# Patient Record
Sex: MALE | Race: WHITE | Employment: FULL TIME | URBAN - NONMETROPOLITAN AREA
[De-identification: names, ages, dates, MRNs, and addresses within clinical notes are randomized per-mention and may not be internally consistent; named-entity substitution may affect disease eponyms.]

---

## 2020-09-15 ENCOUNTER — APPOINTMENT (OUTPATIENT)
Dept: CT IMAGING | Age: 50
End: 2020-09-15
Payer: COMMERCIAL

## 2020-09-15 ENCOUNTER — HOSPITAL ENCOUNTER (OUTPATIENT)
Age: 50
Setting detail: OBSERVATION
Discharge: HOME | End: 2020-09-16
Attending: EMERGENCY MEDICINE | Admitting: INTERNAL MEDICINE
Payer: COMMERCIAL

## 2020-09-15 ENCOUNTER — APPOINTMENT (OUTPATIENT)
Dept: GENERAL RADIOLOGY | Age: 50
End: 2020-09-15
Payer: COMMERCIAL

## 2020-09-15 PROBLEM — R55 SYNCOPE AND COLLAPSE: Status: ACTIVE | Noted: 2020-09-15

## 2020-09-15 LAB
ANION GAP SERPL CALCULATED.3IONS-SCNC: 12 MEQ/L (ref 8–16)
BASOPHILS # BLD: 1.5 %
BASOPHILS ABSOLUTE: 0.1 THOU/MM3 (ref 0–0.1)
BUN BLDV-MCNC: 16 MG/DL (ref 7–22)
CALCIUM SERPL-MCNC: 9.4 MG/DL (ref 8.5–10.5)
CHLORIDE BLD-SCNC: 104 MEQ/L (ref 98–111)
CO2: 24 MEQ/L (ref 23–33)
CREAT SERPL-MCNC: 0.9 MG/DL (ref 0.4–1.2)
D-DIMER QUANTITATIVE: 581 NG/ML FEU (ref 0–500)
EKG ATRIAL RATE: 70 BPM
EKG P AXIS: 68 DEGREES
EKG P-R INTERVAL: 130 MS
EKG Q-T INTERVAL: 378 MS
EKG QRS DURATION: 94 MS
EKG QTC CALCULATION (BAZETT): 408 MS
EKG R AXIS: 76 DEGREES
EKG T AXIS: 71 DEGREES
EKG VENTRICULAR RATE: 70 BPM
EOSINOPHIL # BLD: 6.6 %
EOSINOPHILS ABSOLUTE: 0.4 THOU/MM3 (ref 0–0.4)
ERYTHROCYTE [DISTWIDTH] IN BLOOD BY AUTOMATED COUNT: 13.2 % (ref 11.5–14.5)
ERYTHROCYTE [DISTWIDTH] IN BLOOD BY AUTOMATED COUNT: 46.3 FL (ref 35–45)
FOLATE: 12.5 NG/ML (ref 4.8–24.2)
GFR SERPL CREATININE-BSD FRML MDRD: 89 ML/MIN/1.73M2
GLUCOSE BLD-MCNC: 96 MG/DL (ref 70–108)
HCT VFR BLD CALC: 47.8 % (ref 42–52)
HEMOGLOBIN: 16.3 GM/DL (ref 14–18)
IMMATURE GRANS (ABS): 0.01 THOU/MM3 (ref 0–0.07)
IMMATURE GRANULOCYTES: 0.2 %
LV EF: 58 %
LVEF MODALITY: NORMAL
LYMPHOCYTES # BLD: 34.4 %
LYMPHOCYTES ABSOLUTE: 2 THOU/MM3 (ref 1–4.8)
MCH RBC QN AUTO: 32.6 PG (ref 26–33)
MCHC RBC AUTO-ENTMCNC: 34.1 GM/DL (ref 32.2–35.5)
MCV RBC AUTO: 95.6 FL (ref 80–94)
MONOCYTES # BLD: 8.5 %
MONOCYTES ABSOLUTE: 0.5 THOU/MM3 (ref 0.4–1.3)
NUCLEATED RED BLOOD CELLS: 0 /100 WBC
OSMOLALITY CALCULATION: 280.4 MOSMOL/KG (ref 275–300)
PLATELET # BLD: 264 THOU/MM3 (ref 130–400)
PMV BLD AUTO: 9.3 FL (ref 9.4–12.4)
POTASSIUM REFLEX MAGNESIUM: 4.1 MEQ/L (ref 3.5–5.2)
PRO-BNP: 32.7 PG/ML (ref 0–900)
RBC # BLD: 5 MILL/MM3 (ref 4.7–6.1)
SEG NEUTROPHILS: 48.8 %
SEGMENTED NEUTROPHILS ABSOLUTE COUNT: 2.9 THOU/MM3 (ref 1.8–7.7)
SODIUM BLD-SCNC: 140 MEQ/L (ref 135–145)
TROPONIN T: < 0.01 NG/ML
TSH SERPL DL<=0.05 MIU/L-ACNC: 2.29 UIU/ML (ref 0.4–4.2)
VITAMIN B-12: 430 PG/ML (ref 211–911)
WBC # BLD: 5.9 THOU/MM3 (ref 4.8–10.8)

## 2020-09-15 PROCEDURE — 85379 FIBRIN DEGRADATION QUANT: CPT

## 2020-09-15 PROCEDURE — 93010 ELECTROCARDIOGRAM REPORT: CPT | Performed by: NUCLEAR MEDICINE

## 2020-09-15 PROCEDURE — 93005 ELECTROCARDIOGRAM TRACING: CPT | Performed by: EMERGENCY MEDICINE

## 2020-09-15 PROCEDURE — 71275 CT ANGIOGRAPHY CHEST: CPT

## 2020-09-15 PROCEDURE — 84443 ASSAY THYROID STIM HORMONE: CPT

## 2020-09-15 PROCEDURE — 93306 TTE W/DOPPLER COMPLETE: CPT

## 2020-09-15 PROCEDURE — 80048 BASIC METABOLIC PNL TOTAL CA: CPT

## 2020-09-15 PROCEDURE — 83880 ASSAY OF NATRIURETIC PEPTIDE: CPT

## 2020-09-15 PROCEDURE — 82607 VITAMIN B-12: CPT

## 2020-09-15 PROCEDURE — 81001 URINALYSIS AUTO W/SCOPE: CPT

## 2020-09-15 PROCEDURE — 6360000004 HC RX CONTRAST MEDICATION: Performed by: INTERNAL MEDICINE

## 2020-09-15 PROCEDURE — 82746 ASSAY OF FOLIC ACID SERUM: CPT

## 2020-09-15 PROCEDURE — G0378 HOSPITAL OBSERVATION PER HR: HCPCS

## 2020-09-15 PROCEDURE — 84484 ASSAY OF TROPONIN QUANT: CPT

## 2020-09-15 PROCEDURE — 99284 EMERGENCY DEPT VISIT MOD MDM: CPT

## 2020-09-15 PROCEDURE — 2580000003 HC RX 258: Performed by: INTERNAL MEDICINE

## 2020-09-15 PROCEDURE — 99244 OFF/OP CNSLTJ NEW/EST MOD 40: CPT | Performed by: INTERNAL MEDICINE

## 2020-09-15 PROCEDURE — 71046 X-RAY EXAM CHEST 2 VIEWS: CPT

## 2020-09-15 PROCEDURE — 85025 COMPLETE CBC W/AUTO DIFF WBC: CPT

## 2020-09-15 PROCEDURE — 99219 PR INITIAL OBSERVATION CARE/DAY 50 MINUTES: CPT | Performed by: INTERNAL MEDICINE

## 2020-09-15 PROCEDURE — 36415 COLL VENOUS BLD VENIPUNCTURE: CPT

## 2020-09-15 RX ORDER — SODIUM CHLORIDE 0.9 % (FLUSH) 0.9 %
10 SYRINGE (ML) INJECTION PRN
Status: CANCELLED | OUTPATIENT
Start: 2020-09-15 | End: 2020-09-15

## 2020-09-15 RX ORDER — SODIUM CHLORIDE 9 MG/ML
500 INJECTION, SOLUTION INTRAVENOUS CONTINUOUS PRN
Status: CANCELLED | OUTPATIENT
Start: 2020-09-15 | End: 2020-09-15

## 2020-09-15 RX ORDER — MAGNESIUM SULFATE IN WATER 40 MG/ML
2 INJECTION, SOLUTION INTRAVENOUS PRN
Status: DISCONTINUED | OUTPATIENT
Start: 2020-09-15 | End: 2020-09-16 | Stop reason: HOSPADM

## 2020-09-15 RX ORDER — AMINOPHYLLINE DIHYDRATE 25 MG/ML
50 INJECTION, SOLUTION INTRAVENOUS PRN
Status: CANCELLED | OUTPATIENT
Start: 2020-09-15 | End: 2020-09-15

## 2020-09-15 RX ORDER — ASCORBIC ACID 500 MG
1000 TABLET ORAL DAILY
COMMUNITY

## 2020-09-15 RX ORDER — PROMETHAZINE HYDROCHLORIDE 25 MG/1
12.5 TABLET ORAL EVERY 6 HOURS PRN
Status: DISCONTINUED | OUTPATIENT
Start: 2020-09-15 | End: 2020-09-16 | Stop reason: HOSPADM

## 2020-09-15 RX ORDER — SODIUM CHLORIDE 0.9 % (FLUSH) 0.9 %
10 SYRINGE (ML) INJECTION EVERY 12 HOURS SCHEDULED
Status: DISCONTINUED | OUTPATIENT
Start: 2020-09-15 | End: 2020-09-16 | Stop reason: HOSPADM

## 2020-09-15 RX ORDER — ALBUTEROL SULFATE 2.5 MG/3ML
2.5 SOLUTION RESPIRATORY (INHALATION) EVERY 6 HOURS PRN
Status: CANCELLED | OUTPATIENT
Start: 2020-09-15

## 2020-09-15 RX ORDER — ONDANSETRON 2 MG/ML
4 INJECTION INTRAMUSCULAR; INTRAVENOUS EVERY 6 HOURS PRN
Status: DISCONTINUED | OUTPATIENT
Start: 2020-09-15 | End: 2020-09-16 | Stop reason: HOSPADM

## 2020-09-15 RX ORDER — POLYETHYLENE GLYCOL 3350 17 G/17G
17 POWDER, FOR SOLUTION ORAL DAILY PRN
Status: DISCONTINUED | OUTPATIENT
Start: 2020-09-15 | End: 2020-09-16 | Stop reason: HOSPADM

## 2020-09-15 RX ORDER — POTASSIUM CHLORIDE 20 MEQ/1
40 TABLET, EXTENDED RELEASE ORAL PRN
Status: DISCONTINUED | OUTPATIENT
Start: 2020-09-15 | End: 2020-09-16 | Stop reason: HOSPADM

## 2020-09-15 RX ORDER — POTASSIUM CHLORIDE 7.45 MG/ML
10 INJECTION INTRAVENOUS PRN
Status: DISCONTINUED | OUTPATIENT
Start: 2020-09-15 | End: 2020-09-16 | Stop reason: HOSPADM

## 2020-09-15 RX ORDER — ACETAMINOPHEN 650 MG/1
650 SUPPOSITORY RECTAL EVERY 6 HOURS PRN
Status: DISCONTINUED | OUTPATIENT
Start: 2020-09-15 | End: 2020-09-16 | Stop reason: HOSPADM

## 2020-09-15 RX ORDER — METOPROLOL TARTRATE 5 MG/5ML
5 INJECTION INTRAVENOUS EVERY 5 MIN PRN
Status: CANCELLED | OUTPATIENT
Start: 2020-09-15 | End: 2020-09-15

## 2020-09-15 RX ORDER — NICOTINE 21 MG/24HR
1 PATCH, TRANSDERMAL 24 HOURS TRANSDERMAL DAILY PRN
Status: DISCONTINUED | OUTPATIENT
Start: 2020-09-15 | End: 2020-09-16 | Stop reason: HOSPADM

## 2020-09-15 RX ORDER — NITROGLYCERIN 0.4 MG/1
0.4 TABLET SUBLINGUAL EVERY 5 MIN PRN
Status: CANCELLED | OUTPATIENT
Start: 2020-09-15 | End: 2020-09-15

## 2020-09-15 RX ORDER — ACETAMINOPHEN 325 MG/1
650 TABLET ORAL EVERY 6 HOURS PRN
Status: DISCONTINUED | OUTPATIENT
Start: 2020-09-15 | End: 2020-09-16 | Stop reason: HOSPADM

## 2020-09-15 RX ORDER — SODIUM CHLORIDE 0.9 % (FLUSH) 0.9 %
10 SYRINGE (ML) INJECTION PRN
Status: DISCONTINUED | OUTPATIENT
Start: 2020-09-15 | End: 2020-09-16 | Stop reason: HOSPADM

## 2020-09-15 RX ORDER — ATROPINE SULFATE 0.1 MG/ML
0.5 INJECTION INTRAVENOUS EVERY 5 MIN PRN
Status: CANCELLED | OUTPATIENT
Start: 2020-09-15 | End: 2020-09-15

## 2020-09-15 RX ADMIN — IOPAMIDOL 80 ML: 755 INJECTION, SOLUTION INTRAVENOUS at 20:38

## 2020-09-15 RX ADMIN — Medication 10 ML: at 23:07

## 2020-09-15 ASSESSMENT — PAIN DESCRIPTION - LOCATION
LOCATION_2: HEAD
LOCATION: CHEST

## 2020-09-15 ASSESSMENT — ENCOUNTER SYMPTOMS
SINUS PAIN: 0
DIARRHEA: 0
RHINORRHEA: 0
TROUBLE SWALLOWING: 0
EYE PAIN: 0
BACK PAIN: 0
SORE THROAT: 0
WHEEZING: 0
CHEST TIGHTNESS: 0
CONSTIPATION: 0
SINUS PRESSURE: 0
VOMITING: 0
EYE DISCHARGE: 0
SHORTNESS OF BREATH: 0
ABDOMINAL PAIN: 0
COUGH: 0
COLOR CHANGE: 0

## 2020-09-15 ASSESSMENT — PAIN DESCRIPTION - PAIN TYPE
TYPE_2: ACUTE PAIN
TYPE: ACUTE PAIN

## 2020-09-15 ASSESSMENT — PAIN DESCRIPTION - ONSET
ONSET_2: ON-GOING
ONSET: ON-GOING

## 2020-09-15 ASSESSMENT — PAIN DESCRIPTION - INTENSITY: RATING_2: 6

## 2020-09-15 ASSESSMENT — PAIN DESCRIPTION - DESCRIPTORS
DESCRIPTORS_2: ACHING
DESCRIPTORS: DISCOMFORT

## 2020-09-15 ASSESSMENT — PAIN DESCRIPTION - DURATION: DURATION_2: CONTINUOUS

## 2020-09-15 ASSESSMENT — PAIN DESCRIPTION - PROGRESSION
CLINICAL_PROGRESSION_2: NOT CHANGED
CLINICAL_PROGRESSION: NOT CHANGED

## 2020-09-15 ASSESSMENT — PAIN DESCRIPTION - ORIENTATION
ORIENTATION_2: POSTERIOR
ORIENTATION: LEFT

## 2020-09-15 ASSESSMENT — PAIN DESCRIPTION - FREQUENCY: FREQUENCY: INTERMITTENT

## 2020-09-15 ASSESSMENT — PAIN SCALES - GENERAL: PAINLEVEL_OUTOF10: 6

## 2020-09-15 ASSESSMENT — HEART SCORE: ECG: 0

## 2020-09-15 NOTE — ED TRIAGE NOTES
Patient arrived to room 6 with c/o chest pain, passing out, and headache. Patient stated he has had chest pain for 2 weeks now. Patient stated it is located on the left side. Patient stated he is also having a headache that is only at the back of his head. EKG was completed. Patient stated he is supposed to be taking Wellbutrin but stopped thinking that was causing his passing out episodes. Patient stated he doesn't have the chest pain all the time.  Patient stated when he feels like he is going to pass out he gets real hot first.

## 2020-09-15 NOTE — ED NOTES
ED to inpatient nurses report    Chief Complaint   Patient presents with    Chest Pain    Loss of Consciousness    Headache      Present to ED from home  LOC: alert and orientated to name, place, date  Vital signs   Vitals:    09/15/20 1045 09/15/20 1156   BP: (!) 155/90    Pulse: 70    Resp: 16 16   Temp: 98.2 °F (36.8 °C)    TempSrc: Oral    SpO2: 98% 97%   Weight: 133 lb (60.3 kg)    Height: 5' 10\" (1.778 m)       Oxygen Baseline room air     Current needs required none Bipap/Cpap No  LDAs:   Peripheral IV 09/15/20 Left Forearm (Active)   Site Assessment Clean;Dry; Intact 09/15/20 1052   Line Status Blood return noted;Brisk blood return;Flushed;Normal saline locked;Specimen collected 09/15/20 1052   Dressing Status Clean;Dry; Intact 09/15/20 1052   Dressing Intervention New 09/15/20 1052     Mobility: Independent  Pending ED orders: none   Present condition: pt arrived with chest pain for the last 2 weeks, headache and passing out.      Electronically signed by Oskar Recio RN on 9/15/2020 at 12:13 PM     Oskar Recio RN  09/15/20 1212

## 2020-09-15 NOTE — ED NOTES
[facility] in [facility] condition. Floor contacted before transport. Spoke with [facility].      Renuka Zayas  09/15/20 [id number]

## 2020-09-15 NOTE — CONSULTS
pertinent family history. Review of Systems -   General ROS: negative  Psychological ROS: negative  Hematological and Lymphatic ROS: No history of blood clots or bleeding disorder. Respiratory ROS: no cough, shortness of breath, or wheezing  Cardiovascular ROS: As per HPI  Gastrointestinal ROS: negative  Genito-Urinary ROS: no dysuria, trouble voiding, or hematuria  Musculoskeletal ROS: negative  Neurological ROS: no TIA or stroke symptoms  Dermatological ROS: negative    All others reviewed and are negative.        /79   Pulse 76   Temp 98.1 °F (36.7 °C) (Oral)   Resp 16   Ht 5' 10\" (1.778 m)   Wt 125 lb 1.6 oz (56.7 kg)   SpO2 96%   BMI 17.95 kg/m²       Physical Examination:   General appearance - alert, in no distress  Mental status - alert, oriented to person, place, and time  Neck - supple, no significant adenopathy, no JVD, or carotid bruits  Chest - clear to auscultation, no wheezes, rales or rhonchi, symmetric air entry  Heart - normal rate, regular rhythm, normal S1, S2, no murmurs, rubs, clicks or gallops  Abdomen - soft, nontender, nondistended, no masses or organomegaly  Neurological - alert, oriented, normal speech, no focal findings or movement disorder noted  Musculoskeletal - no joint tenderness, deformity or swelling  Extremities - peripheral pulses normal, no pedal edema, no clubbing or cyanosis  Skin - normal coloration and turgor, no rashes, no suspicious skin lesions noted      LABS:    Recent Labs     09/15/20  1045   TROPONINT < 0.010     CBC:   Lab Results   Component Value Date    WBC 5.9 09/15/2020    RBC 5.00 09/15/2020    HGB 16.3 09/15/2020    HCT 47.8 09/15/2020    MCV 95.6 09/15/2020    MCH 32.6 09/15/2020    MCHC 34.1 09/15/2020    RDW 14.4 04/22/2017     09/15/2020    MPV 9.3 09/15/2020     BMP:    Lab Results   Component Value Date     09/15/2020    K 4.1 09/15/2020     09/15/2020    CO2 24 09/15/2020    BUN 16 09/15/2020    CREATININE 0.9 09/15/2020    CALCIUM 9.4 09/15/2020    LABGLOM 89 09/15/2020    GLUCOSE 96 09/15/2020     Hepatic Function Panel:  No results found for: ALKPHOS, ALT, AST, PROT, BILITOT, BILIDIR, IBILI, LABALBU  Magnesium:  No results found for: MG  Warfarin PT/INR:  No components found for: PTPATWAR, PTINRWAR  HgBA1c:  No results found for: LABA1C  FLP:  No results found for: TRIG, HDL, LDLCALC, LDLDIRECT, LABVLDL  TSH:  No results found for: TSH  BNP: No components found for: PRO-BNP      Assessment/Plan:    Patient Active Problem List   Diagnosis    Syncope and collapse    Chest pain    Elevated blood pressure reading without diagnosis of hypertension     47 y.o. pleasant male with history of tobacco use disorder  who presented to the hospital with complaints of 3 episodes of syncope/collpase over last week with left anterior chest pain that was dull achy and had associated diaphoresis and nausea. No elevation in troponin in ED. EKG showed NSR with Incomplete RBBB. Chest Pain  Syncope and collapse  No troponin elevation at this point. At this point I do not suspect ischemic cardiac in nature. Continue to trend troponin  Obtain lipid panel to fully calculate ACSVD  Obtain echocardiogram and carotid dopplers  Monitor BP- did have elevated BP reading. 24 hour telemetry monitoring  Electrolytes WNL  EKG shows incomplete RBBB  I suggest outpatient event monitor      Please do note hesitate to contact me for any further questions. Thank you for the opportunity to be involved in this patient's care.     Code Status: Full Code    Electronically signed by Lili Coon DO on 9/15/2020 at 2:03 PM

## 2020-09-15 NOTE — CONSULTS
[facility] [place]  Cardiology Consult        Patient:  Karo Miranda  YOB: 1970  MRN: [id number]     Acct: [de-identified]    PCP: Justine Brice MD    Date of Admission: 9/15/2020    Reason for Consultation:    \"chest pain\"    History Of Present Illness:    47 y.o. pleasant male with history of tobacco use disorder who presented to the hospital with complaints of 3 episodes of syncope/collpase over last week with left anterior chest pain that has been constant for a week is \"sore\". He notes he has had associated diaphoresis and nausea before the syncopal episodes. He notes his body \"tingles\" on chest and arm. Does feel lightheaded and dizzy. Denies palpitations. Notes that this occurs while sitting out. Had normal amount of food/was not fasting. Denies previous. No elevation in troponin in ED. EKG showed NSR with Incomplete RBBB. P: echocardiogram pending, no previous  A: lexiscan stress test done 12/2013 negative for ischemia with small perfusion defect at rest in inferior wall likely soft tissue  V: no known significant valvular disease  R: EKG 09/15/2020  Normal sinus rhythm  Incomplete right bundle branch block  Borderline ECG  When compared with ECG of 22-APR-2017 17:03,  No significant change was found  P: no known PAD    Past Medical History:      History reviewed. No pertinent past medical history. Past Surgical History:          Procedure Laterality Date    HIP FRACTURE SURGERY Right     pins placed    KNEE ARTHROCENTESIS Bilateral     NASAL SINUS SURGERY Left        Medications Prior to Admission:      Prior to Admission medications    Medication Sig Start Date End Date Taking?  Authorizing Provider   vitamin C (ASCORBIC ACID) 500 MG tablet Take 1,000 mg by mouth daily   Yes Historical Provider, MD   buPROPion HCl (WELLBUTRIN PO) Take by mouth   Yes Historical Provider, MD   Sodium & Potassium Bicarbonate (OSMANI-SELTZER GOLD) TBEF Take by mouth 2 times daily Historical Provider, MD   DM-Doxylamine-Acetaminophen (NYQUIL COLD & FLU PO) Take by mouth    Historical Provider, MD       Current Facility-Administered Medications   Medication Dose Route Frequency Provider Last Rate Last Dose    sodium chloride flush 0.9 % injection 10 mL  10 mL Intravenous 2 times per day Heather Sampson MD        sodium chloride flush 0.9 % injection 10 mL  10 mL Intravenous PRN Heather Sampson MD        acetaminophen (TYLENOL) tablet 650 mg  650 mg Oral Q6H PRN Dealila Sampson MD        Or   [facility] acetaminophen (TYLENOL) suppository 650 mg  650 mg Rectal Q6H PRN Deatra MD Camilla        polyethylene glycol (GLYCOLAX) packet 17 g  17 g Oral Daily PRN Heather Samspon MD        promethazine (PHENERGAN) tablet 12.5 mg  12.5 mg Oral Q6H PRN Heather Sampson MD        Or    ondansetron (ZOFRAN) injection 4 mg  4 mg Intravenous Q6H PRN Deatra MD Camilla       [facility] [START ON 9/16/2020] enoxaparin (LOVENOX) injection 40 mg  40 mg Subcutaneous Daily Heather Sampson MD        potassium chloride (KLOR-CON M) extended release tablet 40 mEq  40 mEq Oral PRN Heather Sampson MD        Or   [facility] potassium bicarb-citric acid (EFFER-K) effervescent tablet 40 mEq  40 mEq Oral PRN Deatra MD Camilla        Or   [facility] potassium chloride 10 mEq/100 mL IVPB (Peripheral Line)  10 mEq Intravenous PRN Heather Sampson MD        potassium chloride 10 mEq/100 mL IVPB (Peripheral Line)  10 mEq Intravenous PRN Heather Sampson MD        magnesium sulfate 2 g in 50 mL IVPB premix  2 g Intravenous PRN Dealila Sampson MD        nicotine (NICODERM CQ) 14 MG/24HR 1 patch  1 patch Transdermal Daily PRN Heather Sampson MD           Allergies:  Patient has no known allergies. Social History:    TOBACCO:   reports that he has been smoking cigarettes. He has a 25.00 pack-year smoking history. He does not have any smokeless tobacco history on file. ETOH:   reports current alcohol use. Family History:    History reviewed.  No pertinent family history. Review of Systems -   General ROS: negative  Psychological ROS: negative  Hematological and Lymphatic ROS: No history of blood clots or bleeding disorder. Respiratory ROS: no cough, shortness of breath, or wheezing  Cardiovascular ROS: As per HPI  Gastrointestinal ROS: negative  Genito-Urinary ROS: no dysuria, trouble voiding, or hematuria  Musculoskeletal ROS: negative  Neurological ROS: no TIA or stroke symptoms  Dermatological ROS: negative    All others reviewed and are negative.        /79   Pulse 76   Temp 98.1 °F (36.7 °C) (Oral)   Resp 16   Ht 5' 10\" (1.778 m)   Wt 125 lb 1.6 oz (56.7 kg)   SpO2 96%   BMI 17.95 kg/m²       Physical Examination:   General appearance - alert, in no distress  Mental status - alert, oriented to person, place, and time  Neck - supple, no significant adenopathy, no JVD, or carotid bruits  Chest - clear to auscultation, no wheezes, rales or rhonchi, symmetric air entry  Heart - normal rate, regular rhythm, normal S1, S2, no murmurs, rubs, clicks or gallops  Abdomen - soft, nontender, nondistended, no masses or organomegaly  Neurological - alert, oriented, normal speech, no focal findings or movement disorder noted  Musculoskeletal - no joint tenderness, deformity or swelling  Extremities - peripheral pulses normal, no pedal edema, no clubbing or cyanosis  Skin - normal coloration and turgor, no rashes, no suspicious skin lesions noted      LABS:    Recent Labs     09/15/20  1045   TROPONINT < 0.010     CBC:   Lab Results   Component Value Date    WBC 5.9 09/15/2020    RBC 5.00 09/15/2020    HGB 16.3 09/15/2020    HCT 47.8 09/15/2020    MCV 95.6 09/15/2020    MCH 32.6 09/15/2020    MCHC 34.1 09/15/2020    RDW 14.4 04/22/2017     09/15/2020    MPV 9.3 09/15/2020     BMP:    Lab Results   Component Value Date     09/15/2020    K 4.1 09/15/2020     09/15/2020    CO2 24 09/15/2020    BUN 16 09/15/2020    CREATININE 0.9

## 2020-09-15 NOTE — H&P
History & Physical        Patient:  Beny Connelly  YOB: 1970  MRN: [id number]   PCP: Navin Castillo MD         Acct: [de-identified]    Date of Admission: 9/15/2020  Date of Service: Patient seen and examined on 9/15/2020       Assessment/plan   ASSESSMENT/ PLAN:  1. Multiple episodes of syncope and collapse with prodrome of left anterior chest pain, diaphoresis and nausea: Echocardiogram. Bilateral carotid doppler. Trend troponin. Cardiology consulted. Screen with D-dimer, if positive, obtain CTA of the chest. Check Vitamin B12, folate, TSH, UA. Check lipid panel  2. Elevated blood pressure without a previous diagnosis of HTN: Possibly contributory to #1. Continue to monitor      DVT prophylaxis: [x] Lovenox  Disposition:    [x] Home  Code Status: FULL CODE  Placed in observation. Electronically signed by Donnie Leung MD on 9/15/2020 at 1:53 PM      Chief Complaint     Syncope    History of PresentIllness     The patient is a 47 y.o.male current smoker who presented to the ED on 9/15/2020 with 3 episodes of syncope and collapse over the period of a week, accompanied by a prodrome of left anterior chest pain up to 4-5/10 in intensity, dull achy, non-pleuritic in nature, diaphoresis and nausea. He otherwise denied fevers, chills, cough, abdominal pain, problems urinating or lower extremity peripheral edema. He denied a FH of heart disease but also mentioned that he does not know his father's history that well. In the ED, EKG showed normal sinus rhythm with an incomplete right bundle branch block. CXR was unremarkable for an acute process. Troponin was <0.010. In the ED his BP was slightly elevated at 155/90.  At the moment, the patient is to be admitted as an observation patient for chest pain, syncope and collapse    Past Medical History     Current smoker    Past Surgical History         Procedure Laterality Date    HIP FRACTURE SURGERY Right     pins placed    KNEE ARTHROCENTESIS Bilateral Social History Narrative    None         TOBACCO:   reports that he has been smoking cigarettes. He has a 25.00 pack-year smoking history. He does not have any smokeless tobacco history on file. ETOH:   reports current alcohol use. Review of systems     Pertinent positives as noted in the HPI. All other systems reviewed and negative. Review of Systems      Physical examination     /79   Pulse 76   Temp 98.1 °F (36.7 °C) (Oral)   Resp 16   Ht 5' 10\" (1.778 m)   Wt 125 lb 1.6 oz (56.7 kg)   SpO2 96%   BMI 17.95 kg/m²     General appearance:  No apparent distress. HEENT: Normocephalic. PERRL. Extraocular motion intact. Conjunctivae clear. Nose symmetric without evidence of discharge. Oral mucosa moist w/o erythema or exudate. Neck: Supple. No JVD. No carotid bruits. Trachea midline. No thyromegaly. Cardiovascular:  RRR w/ normal S1/S2. No murmurs, rubs or gallops. Respiratory: Clear to auscultation, bilaterally without rales/wheezes/rhonchi. Abdomen: Soft, non-tender, non-distended with normal bowel sounds. Musculoskeletal:  Full ROM without deformity. Neurologic:  No focal sensory/motor deficits. Cranial nerves: II-XII intact. Lymphatic: Deferred  Psychiatric:  Alert and oriented. Vascular: Dorsalis pedis pulses bilaterally palpable 2+. Radial pulses palpable bilaterally 2+. No peripheral edema. Capillary refill<3 seconds  Genitourinary: Deferred. Skin: No visible rashes or lesions. Labs and imaging     Recent Labs     09/15/20  1045   WBC 5.9   HGB 16.3   HCT 47.8        Recent Labs     09/15/20  1045      K 4.1      CO2 24   BUN 16   CREATININE 0.9   CALCIUM 9.4     No results for input(s): AST, ALT, BILIDIR, BILITOT, ALKPHOS in the last 72 hours. No results for input(s): INR in the last 72 hours. No results for input(s): Natalie Formosa in the last 72 hours.     Urinalysis:   No results found for: Luz Milo, [address], BACTERIA, RBCUA, BLOODU, [address], [facility] [address]    Radiology:     CXR: I have reviewed the CXR with the following interpretation: No acute cardiopulmonary process. Evidence for the beginnings of COPD with flattening of the diaphragm  EKG:  I have reviewed the EKG with the following interpretation: Sinus rhythm. Incomplete right bundle branch block    Xr Chest (2 Vw)    Result Date: 9/15/2020  PROCEDURE: XR CHEST (2 VW) CLINICAL INFORMATION: Chest pain TECHNIQUE: PA and lateral chest radiographs. COMPARISON: PA and lateral chest radiographs 4/22/2017 FINDINGS: Cardiomediastinal silhouette is within normal limits. Lungs are hyperinflated with flattening of the diaphragms. There are no lung consolidations. Degenerative changes in the thoracic spine are stable. Soft tissues are unremarkable. 1. No acute intrathoracic process. 2. Findings suggestive of chronic lung disease. **This report has been created using voice recognition software. It may contain minor errors which are inherent in voice recognition technology. ** Final report electronically signed by Dr. Joshua Bae on 9/15/2020 11:35 AM          Electronically signed by Yung Cid MD on 9/15/2020 at 1:53 PM

## 2020-09-15 NOTE — PROGRESS NOTES
Pt admitted to  6 in a wheelchair. Complaints: Chest pain / discomfort  syncope. IV site free of s/s of infection or infiltration. Vital signs obtained. Assessment and data collection initiated. Two nurse skin assessment performed by Tabatha Hector and [address] [place] [address],[address]. Oriented to room. Policies and procedures for  explained. Vishnu Crow RN discussed hourly rounding with patient addressing 5 P's. Fall prevention and safety brochure discussed with patient. Bed alarm on. Call light in reach. The best day to schedule a follow up Dr appointment is:  Monday a.m. Explained patients right to have family, representative or physician notified of their admission. Patient has Declined for physician to be notified. Patient has Declined for family/representative to be notified. All questions answered with no further questions at this time. Which family member is the designated  daughter number in chart. Do you want them contacted on admission and updated every shift no.

## 2020-09-15 NOTE — CARE COORDINATION
DISCHARGE PLANNING EVALUATION: OP/OBSERVATION        9/15/20, 2:05 PM EDT    Alba Holden       Admitted from: ER 9/15/2020/ 1042   Location: [facility]09/009-A Reason for admit: Syncope and collapse [R55]   Admit order signed?: yes    Pertinent Info/Orders/Treatment Plan: Trop neg. Ortho BP neg. Cardiology consulted,   Echo,carotid dopplers, and stress test ordered. PCP: Jenaro Alatorre MD    Patient Goals/Plan/Treatment Preferences:  Spoke with Flaco Morrison, he plans return home at discharge. He has partial custody of his daughter, she lives with him several days of the week. He is independent, denies need [facility]. Transportation/Food Security/Housekeeping Addressed:  No issues identified.

## 2020-09-15 NOTE — PROGRESS NOTES
Pharmacy Medication History Note      List of current medications patient is taking is complete. Source of information: patient    Changes made to medication list:  Medications removed (include reason, ex. therapy complete or physician discontinued): Bupropion - therapy complete  Nyquil cold & flu - therapy complete  Reba seltzer gold - therapy complete    Medications added/doses adjusted:  None     Other notes (ex. Recent course of antibiotics, Coumadin dosing):  Denies use of other OTC or herbal medications.       Allergies reviewed      Electronically signed by DEVONTE Oquendo [facility] on 9/15/2020 at 3:23 PM

## 2020-09-15 NOTE — ED PROVIDER NOTES
normal. Pulsoximetry is normal per my interpretation. Additional Vital Signs:  Vitals:    09/15/20 1156   BP:    Pulse:    Resp: 16   Temp:    SpO2: 97%       Physical Exam  Constitutional:       General: He is not in acute distress. Appearance: Normal appearance. He is not ill-appearing or diaphoretic. HENT:      Head: Normocephalic and atraumatic. Mouth/Throat:      Mouth: Mucous membranes are moist.      Pharynx: Oropharynx is clear. No oropharyngeal exudate or posterior oropharyngeal erythema. Eyes:      Extraocular Movements: Extraocular movements intact. Conjunctiva/sclera: Conjunctivae normal.      Pupils: Pupils are equal, round, and reactive to light. Neck:      Musculoskeletal: Normal range of motion. No neck rigidity or muscular tenderness. Cardiovascular:      Rate and Rhythm: Normal rate and regular rhythm. Pulses: Normal pulses. Heart sounds: Normal heart sounds. No murmur. No friction rub. No gallop. Pulmonary:      Effort: Pulmonary effort is normal.      Breath sounds: Normal breath sounds. No wheezing, rhonchi or rales. Abdominal:      Palpations: Abdomen is soft. Tenderness: There is no abdominal tenderness. There is no guarding or rebound. Musculoskeletal: Normal range of motion. General: No swelling. Right lower leg: No edema. Left lower leg: No edema. Skin:     General: Skin is warm and dry. Capillary Refill: Capillary refill takes less than 2 seconds. Findings: No bruising, erythema or lesion. Neurological:      General: No focal deficit present. Mental Status: He is alert and oriented to person, place, and time. Cranial Nerves: No cranial nerve deficit. Sensory: No sensory deficit. Motor: No weakness. MEDICAL DECISION MAKING   Initial Assessment: 50-year-old comfortable appearing male without any acute distress. Concern for cardiogenic syncope.   No acute EKG abnormalities, initial troponin negative. Benign physical exam, negative orthostatics. No white blood cell count, BNP normal.     Because of the patient's 2 episodes of syncope, risk factors, and presentation I am suggesting ED observation with cardiology to follow. Patient is agreeable to this plan admission to the hospital.          ED RESULTS   Laboratory results:  Labs Reviewed   CBC WITH AUTO DIFFERENTIAL - Abnormal; Notable for the following components:       Result Value    MCV 95.6 (*)     RDW-SD 46.3 (*)     MPV 9.3 (*)     All other components within normal limits   GLOMERULAR FILTRATION RATE, ESTIMATED - Abnormal; Notable for the following components:    Est, Glom Filt Rate 89 (*)     All other components within normal limits   BASIC METABOLIC PANEL W/ REFLEX TO MG FOR LOW K   BRAIN NATRIURETIC PEPTIDE   TROPONIN   ANION GAP   OSMOLALITY       Radiologic studies results:  XR CHEST (2 VW)   Final Result   1. No acute intrathoracic process. 2. Findings suggestive of chronic lung disease. **This report has been created using voice recognition software. It may contain minor errors which are inherent in voice recognition technology. **      Final report electronically signed by Dr. Kristin Osborne on 9/15/2020 11:35 AM          ED Medications administered this visit: Medications - No data to display          FINAL DISPOSITION     Final diagnoses:   Syncope and collapse   Chest pain, unspecified type     Condition: condition: stable  Dispo: Admit to observation      This transcription was electronically signed. Parts of this transcriptions may have been dictated by use of voice recognition software and electronically transcribed, and parts may have been transcribed with the assistance of an ED scribe. The transcription may contain errors not detected in proofreading. Please refer to my supervising physician's documentation if my documentation differs.     Electronically Signed: Jonah Solitario, 09/15/20, 12:20 ARI Schrader DO  Resident  09/15/20 0856

## 2020-09-16 ENCOUNTER — APPOINTMENT (OUTPATIENT)
Dept: INTERVENTIONAL RADIOLOGY/VASCULAR | Age: 50
End: 2020-09-16
Payer: COMMERCIAL

## 2020-09-16 ENCOUNTER — APPOINTMENT (OUTPATIENT)
Dept: NON INVASIVE DIAGNOSTICS | Age: 50
End: 2020-09-16
Payer: COMMERCIAL

## 2020-09-16 VITALS
TEMPERATURE: 98.3 F | BODY MASS INDEX: 18.21 KG/M2 | RESPIRATION RATE: 16 BRPM | WEIGHT: 127.2 LBS | HEIGHT: 70 IN | SYSTOLIC BLOOD PRESSURE: 121 MMHG | DIASTOLIC BLOOD PRESSURE: 69 MMHG | OXYGEN SATURATION: 99 % | HEART RATE: 61 BPM

## 2020-09-16 LAB
ANION GAP SERPL CALCULATED.3IONS-SCNC: 10 MEQ/L (ref 8–16)
BACTERIA: ABNORMAL
BILIRUBIN URINE: NEGATIVE
BLOOD, URINE: NEGATIVE
BUN BLDV-MCNC: 17 MG/DL (ref 7–22)
CALCIUM SERPL-MCNC: 9.2 MG/DL (ref 8.5–10.5)
CASTS: ABNORMAL /LPF
CASTS: ABNORMAL /LPF
CHARACTER, URINE: CLEAR
CHLORIDE BLD-SCNC: 105 MEQ/L (ref 98–111)
CHOLESTEROL, TOTAL: 216 MG/DL (ref 100–199)
CO2: 23 MEQ/L (ref 23–33)
COLOR: YELLOW
CREAT SERPL-MCNC: 0.9 MG/DL (ref 0.4–1.2)
CRYSTALS: ABNORMAL
EPITHELIAL CELLS, UA: ABNORMAL /HPF
ERYTHROCYTE [DISTWIDTH] IN BLOOD BY AUTOMATED COUNT: 13.2 % (ref 11.5–14.5)
ERYTHROCYTE [DISTWIDTH] IN BLOOD BY AUTOMATED COUNT: 46.5 FL (ref 35–45)
GFR SERPL CREATININE-BSD FRML MDRD: 89 ML/MIN/1.73M2
GLUCOSE BLD-MCNC: 105 MG/DL (ref 70–108)
GLUCOSE, URINE: NEGATIVE MG/DL
HCT VFR BLD CALC: 46.9 % (ref 42–52)
HDLC SERPL-MCNC: 38 MG/DL
HEMOGLOBIN: 15.8 GM/DL (ref 14–18)
KETONES, URINE: NEGATIVE
LDL CHOLESTEROL CALCULATED: 156 MG/DL
LEUKOCYTE ESTERASE, URINE: NEGATIVE
MCH RBC QN AUTO: 32.2 PG (ref 26–33)
MCHC RBC AUTO-ENTMCNC: 33.7 GM/DL (ref 32.2–35.5)
MCV RBC AUTO: 95.7 FL (ref 80–94)
MISCELLANEOUS LAB TEST RESULT: ABNORMAL
NITRITE, URINE: NEGATIVE
PH UA: 7.5 (ref 5–9)
PLATELET # BLD: 277 THOU/MM3 (ref 130–400)
PMV BLD AUTO: 9.1 FL (ref 9.4–12.4)
POTASSIUM REFLEX MAGNESIUM: 4.5 MEQ/L (ref 3.5–5.2)
PROTEIN UA: NEGATIVE MG/DL
RBC # BLD: 4.9 MILL/MM3 (ref 4.7–6.1)
RBC URINE: ABNORMAL /HPF
RENAL EPITHELIAL, UA: ABNORMAL
SODIUM BLD-SCNC: 138 MEQ/L (ref 135–145)
SPECIFIC GRAVITY UA: > 1.03 (ref 1–1.03)
TRIGL SERPL-MCNC: 108 MG/DL (ref 0–199)
UROBILINOGEN, URINE: 1 EU/DL (ref 0–1)
WBC # BLD: 5.5 THOU/MM3 (ref 4.8–10.8)
WBC UA: ABNORMAL /HPF
YEAST: ABNORMAL

## 2020-09-16 PROCEDURE — 80061 LIPID PANEL: CPT

## 2020-09-16 PROCEDURE — 6360000002 HC RX W HCPCS

## 2020-09-16 PROCEDURE — 80048 BASIC METABOLIC PNL TOTAL CA: CPT

## 2020-09-16 PROCEDURE — 94760 N-INVAS EAR/PLS OXIMETRY 1: CPT

## 2020-09-16 PROCEDURE — 99214 OFFICE O/P EST MOD 30 MIN: CPT | Performed by: NURSE PRACTITIONER

## 2020-09-16 PROCEDURE — A9500 TC99M SESTAMIBI: HCPCS | Performed by: STUDENT IN AN ORGANIZED HEALTH CARE EDUCATION/TRAINING PROGRAM

## 2020-09-16 PROCEDURE — 99217 PR OBSERVATION CARE DISCHARGE MANAGEMENT: CPT | Performed by: FAMILY MEDICINE

## 2020-09-16 PROCEDURE — 93880 EXTRACRANIAL BILAT STUDY: CPT

## 2020-09-16 PROCEDURE — 36415 COLL VENOUS BLD VENIPUNCTURE: CPT

## 2020-09-16 PROCEDURE — 93017 CV STRESS TEST TRACING ONLY: CPT | Performed by: INTERNAL MEDICINE

## 2020-09-16 PROCEDURE — G0378 HOSPITAL OBSERVATION PER HR: HCPCS

## 2020-09-16 PROCEDURE — 3430000000 HC RX DIAGNOSTIC RADIOPHARMACEUTICAL: Performed by: STUDENT IN AN ORGANIZED HEALTH CARE EDUCATION/TRAINING PROGRAM

## 2020-09-16 PROCEDURE — 78452 HT MUSCLE IMAGE SPECT MULT: CPT

## 2020-09-16 PROCEDURE — 6360000002 HC RX W HCPCS: Performed by: INTERNAL MEDICINE

## 2020-09-16 PROCEDURE — 2580000003 HC RX 258: Performed by: INTERNAL MEDICINE

## 2020-09-16 PROCEDURE — 85027 COMPLETE CBC AUTOMATED: CPT

## 2020-09-16 PROCEDURE — 96372 THER/PROPH/DIAG INJ SC/IM: CPT

## 2020-09-16 PROCEDURE — 93270 REMOTE 30 DAY ECG REV/REPORT: CPT

## 2020-09-16 RX ADMIN — Medication 35 MILLICURIE: at 08:30

## 2020-09-16 RX ADMIN — ENOXAPARIN SODIUM 40 MG: 40 INJECTION SUBCUTANEOUS at 09:56

## 2020-09-16 RX ADMIN — Medication 9.9 MILLICURIE: at 07:30

## 2020-09-16 RX ADMIN — Medication 10 ML: at 09:57

## 2020-09-16 ASSESSMENT — PAIN SCALES - GENERAL: PAINLEVEL_OUTOF10: 0

## 2020-09-16 NOTE — PROGRESS NOTES
Hospitalist Progress Note    Patient:  Ga Valencia      Unit/Bed:6K-09/009-A    YOB: 1970    MRN: [id number]       Acct: [de-identified]     PCP: Brittney Clifton MD    Date of Admission: 9/15/2020    Assessment/Plan:    Anticipated Discharge in :     DEEP/[facility] 1106 Problems    Diagnosis Date Noted    Syncope and collapse [R55] 09/15/2020    Chest pain [R07.9]     Elevated blood pressure reading without diagnosis of hypertension [R03.0]      Syncope and collapse  with prodrome of left anterior chest pain, diaphoresis and nausea  Echocardiogram showed EF of 55 to 51%, normal systolic function and wall thickness  Troponins negative  Bilateral carotid Doppler was unremarkable, no significant stenosis  Cardiology was consulted, stress test done today awaiting for final result  Orthostatic vital signs unremarkable  D-dimer 581, CTA chest no PE, 7 mm subserosal nodule in the right upper lobe. Follow-up CT may be done in 3 to 6 months  TSH normal, lipid panel unremarkable    Elevated blood pressure without a previous diagnosis of HTN, resolved      Chief Complaint:   Syncope    Hospital Course: The patient is a 47 y.o.male current smoker who presented to the ED on 9/15/2020 with 3 episodes of syncope and collapse over the period of a week, accompanied by a prodrome of left anterior chest pain up to 4-5/10 in intensity, dull achy, non-pleuritic in nature, diaphoresis and nausea. He otherwise denied fevers, chills, cough, abdominal pain, problems urinating or lower extremity peripheral edema. He denied a FH of heart disease but also mentioned that he does not know his father's history that well. In the ED, EKG showed normal sinus rhythm with an incomplete right bundle branch block. CXR was unremarkable for an acute process. Echo and bilateral carotid Doppler were both unremarkable. Troponins negative. Cardiology was consulted and did a stress test, pending final result.   Orthostatic vital signs minor errors which are inherent in voice recognition technology. **      Final report electronically signed by Dr. Jenny Vilchis MD on 9/15/2020 8:59 PM      XR CHEST (2 VW)   Final Result   1. No acute intrathoracic process. 2. Findings suggestive of chronic lung disease. **This report has been created using voice recognition software. It may contain minor errors which are inherent in voice recognition technology. **      Final report electronically signed by Dr. Payton Yadav on 9/15/2020 11:35 AM          Diet: DIET GENERAL; No Added Salt (3-4 GM)    DVT prophylaxis: [] Lovenox                                 [] SCDs                                 [] SQ Heparin                                 [] Encourage ambulation           [] Already on Anticoagulation     Disposition:    [] Home       [] TCU       [] Rehab       [] Psych       [] SNF       [] [facility]       [] Other-    Code Status: Full Code    PT/OT Eval Status:         Electronically signed by Classie Sacks, MD on 9/16/2020 at 10:17 AM

## 2020-09-16 NOTE — PROCEDURES
30 day cardiac event monitor applied. Instructions given. Prep taught. Patient understands.   Rosaline, CCT

## 2020-09-16 NOTE — PROGRESS NOTES
Discharge teaching and instructions for diagnosis/procedure of Lightheadedness completed with patient using teachback method. AVS reviewed. Printed prescriptions given to patient. Patient voiced understanding regarding prescriptions, follow up appointments, and care of self at home. Discharged ambulatory to  home with support per self.

## 2020-09-16 NOTE — PROGRESS NOTES
Cardiology Progress Note      Patient:  Audrey Solano  YOB: 1970  MRN: [id number]   Acct: [de-identified]  Admit Date:  9/15/2020  Primary Cardiologist: Jesus Higginbotham MD     Per Dr. Chiquis Coleman consult note  Reason for Consultation:    \"chest pain\"     History Of Present Illness:    47 y.o. pleasant male with history of tobacco use disorder who presented to the hospital with complaints of 3 episodes of syncope/collpase over last week with left anterior chest pain that has been constant for a week is \"sore\". He notes he has had associated diaphoresis and nausea before the syncopal episodes. He notes his body \"tingles\" on chest and arm. Does feel lightheaded and dizzy. Denies palpitations. Notes that this occurs while sitting out. Had normal amount of food/was not fasting. Denies previous. No elevation in troponin in ED. EKG showed NSR with Incomplete RBBB.      P: echocardiogram pending, no previous  A: lexiscan stress test done 12/2013 negative for ischemia with small perfusion defect at rest in inferior wall likely soft tissue  V: no known significant valvular disease  R: EKG 09/15/2020  Normal sinus rhythm  Incomplete right bundle branch block  Borderline ECG  When compared with ECG of 22-APR-2017 17:03,  No significant change was found  P: no known PAD     Subjective (Events in last 24 hours): F/U cjest pain/syncope. Had stress test today. Resting in bed in nad on room air. No further chest pain or syncope since admit. Denies dizziness or lightheadedness. Denies palpitations or sob.        Objective:   /69   Pulse 62   Temp 98.1 °F (36.7 °C) (Oral)   Resp 16   Ht 5' 10\" (1.778 m)   Wt 127 lb 3.2 oz (57.7 kg)   SpO2 99% Comment: no O2 needed per protocol  BMI 18.25 kg/m²        TELEMETRY: SR    Physical Exam:  General Appearance: alert and oriented to person, place and time, in no acute distress  Cardiovascular: normal rate, regular rhythm, normal S1 and S2, no murmurs, rubs, clicks, or gallops, distal pulses intact, no carotid bruits, no JVD  Pulmonary/Chest: clear to auscultation bilaterally- no wheezes, rales or rhonchi, normal air movement, no respiratory distress  Abdomen: soft, non-tender, non-distended, normal bowel sounds, no masses   Extremities: no cyanosis, clubbing or edema, pulses palpable   Skin: warm and dry, no rash or erythema  Head: normocephalic and atraumatic  Eyes: pupils equal, round, and reactive to light  Neck: supple and non-tender without mass, no thyromegaly   Musculoskeletal: normal range of motion, no joint swelling, deformity or tenderness  Neurological: alert, oriented, normal speech, no focal findings or movement disorder noted    Medications:    sodium chloride flush  10 mL Intravenous 2 times per day    enoxaparin  40 mg Subcutaneous Daily       sodium chloride flush, 10 mL, PRN  acetaminophen, 650 mg, Q6H PRN    Or  acetaminophen, 650 mg, Q6H PRN  polyethylene glycol, 17 g, Daily PRN  promethazine, 12.5 mg, Q6H PRN    Or  ondansetron, 4 mg, Q6H PRN  potassium chloride, 40 mEq, PRN    Or  potassium alternative oral replacement, 40 mEq, PRN    Or  potassium chloride, 10 mEq, PRN  potassium chloride, 10 mEq, PRN  magnesium sulfate, 2 g, PRN  nicotine, 1 patch, Daily PRN  regadenoson, 0.4 mg, ONCE PRN        Diagnostics:  EK/15/20  Normal sinus rhythm  Incomplete right bundle branch block  Borderline ECG  When compared with ECG of 2017 17:03,  No significant change was found  Confirmed by Isra Jimenez   Echo: 9/15/20    Summary   Technically difficult examination. Left ventricular size and systolic function is normal. Ejection fraction   was estimated at 55-60%. LV wall thickness is within normal limits and   there are no obvious wall motion abnormalities. The right ventricular size appears normal with normal systolic function   and wall thickness.       Signature      ----------------------------------------------------------------   Electronically signed by Carlos Kent MD   Stress: 12/2/2013  IMPRESSIONS:   -  No evidence of ischemia is noted on this study . Small   perfusion defect is noted at rest in inferior wall likely soft tissue. -  Normal study after pharmacologic stress. -  Left ventricular systolic function was normal.     Prepared and signed by     Zenobia Chen DO   Signed 12/02/2013 14:07:31       Lab Data:    Cardiac Enzymes:  No results for input(s): CKTOTAL, CKMB, CKMBINDEX, TROPONINI in the last 72 hours. CBC:   Lab Results   Component Value Date    WBC 5.5 09/16/2020    RBC 4.90 09/16/2020    HGB 15.8 09/16/2020    HCT 46.9 09/16/2020     09/16/2020       CMP:    Lab Results   Component Value Date     09/16/2020    K 4.5 09/16/2020     09/16/2020    CO2 23 09/16/2020    BUN 17 09/16/2020    CREATININE 0.9 09/16/2020    LABGLOM 89 09/16/2020    GLUCOSE 105 09/16/2020    CALCIUM 9.2 09/16/2020       Hepatic Function Panel:  No results found for: ALKPHOS, ALT, AST, PROT, BILITOT, BILIDIR, IBILI, LABALBU    Magnesium:  No results found for: MG    PT/INR:  No results found for: PROTIME, INR    HgBA1c:  No results found for: LABA1C    FLP:    Lab Results   Component Value Date    TRIG 108 09/16/2020    HDL 38 09/16/2020    LDLCALC 156 09/16/2020       TSH:    Lab Results   Component Value Date    TSH 2.290 09/15/2020         Assessment:  · Chest pain: MI ruled out, no further chest pain  · Stress test 2013   No evidence of ischemia is noted on this study . Small   perfusion defect is noted at rest in inferior wall likely soft tissue. -  Normal study after pharmacologic stress.    -  Left ventricular systolic function was normal.   · Syncope and collapse: 3 episodes over last week  Bilateral carotid dopplers negative  Orthostatic vital signs negative  CTA chest with no PE  Echo with EF 55-60, no wma  · Current smoker: cessation counseling given      Plan:  · Stress test completed, results pending  If stress test negative for ischemia plan discharge when deemed appropriate by attending. Discharge with 30 day event monitor and f/u with Dr. Sommer Oconnor in 4-6 weeks as scheduled.          Electronically signed by MILLIE Spann CNP on 9/16/2020 at 3:37 PM

## 2020-09-16 NOTE — PLAN OF CARE
Problem: Cardiac:  Goal: Cardiovascular alteration will improve  Description: Cardiovascular alteration will improve  Outcome: Ongoing  Note: Patient demonstrates adequate cardiac output as evidenced by blood pressure and pulse rate and rhythm within normal parameters for patient; strong peripheral pulses; and an ability to tolerate activity without symptoms of dyspnea, syncope, or chest pain. Problem: Skin Integrity:  Goal: Absence of new skin breakdown  Description: Absence of new skin breakdown  Outcome: Ongoing  Note: Patient free from skin breakdown. Patient turns self and makes frequent positional changes. Will continue to monitor. Problem: Discharge Planning:  Goal: Discharged to appropriate level of care  Description: Discharged to appropriate level of care  Outcome: Ongoing  Note: Patient plans to be discharged to home when medically stable. Problem: Nutrition Deficit:  Goal: Ability to achieve adequate nutritional intake will improve  Description: Ability to achieve adequate nutritional intake will improve  Outcome: Ongoing  Note: Patient on a general diet. Eating 100% of meals. Will continue to monitor intake and output. Care plan reviewed with patient. Patient verbalized understanding of the plan of care and contribute to goal setting.

## 2020-09-16 NOTE — DISCHARGE SUMMARY
Short Discharge Summary      Patient Identification:   Natalie Ricardo   : 1970  MRN: [id number]   Account: [de-identified]      Patient's PCP: Cristopher Rasheed MD    Admit Date: 9/15/2020     Discharge Date:  2020    Admitting Physician: Ken Sanchez MD     Discharge Physician: Shell Ly MD     Discharge Diagnoses: Active Hospital Problems    Diagnosis Date Noted    Syncope and collapse [R55] 09/15/2020    Chest pain [R07.9]     Elevated blood pressure reading without diagnosis of hypertension [R03.0]      Syncope and collapse  with prodrome of left anterior chest pain, diaphoresis and nausea  Echocardiogram showed EF of 55 to 83%, normal systolic function and wall thickness  Troponins negative  Bilateral carotid Doppler was unremarkable, no significant stenosis  Cardiology was consulted  stress test is not suggestive for ischemia  Orthostatic vital signs unremarkable  D-dimer 581, CTA chest no PE, 7 mm subserosal nodule in the right upper lobe. Follow-up CT may be done in 3 to 6 months  TSH normal, lipid panel unremarkable  30 day event monitor ordered by cardiology    Chest Pain, MI ruled out  Stress test negative for ischemia, trops negative     Elevated blood pressure without a previous diagnosis of HTN, resolved      The patient was seen and examined on day of discharge and this discharge summary is in conjunction with any daily progress note from day of discharge. Please refer to the current progress note for the complete hospital course. Discharged stable and will follow-up with PCP, and cardio as outpatient. Consults:     IP CONSULT TO CARDIOLOGY    Disposition:    [x] Home       [] TCU       [] Rehab       [] Psych       [] SNF       [] [facility]       [] Other-    Condition at Discharge: Stable    Code Status:  Full Code     Patient Instructions:    Discharge lab work:   Activity: activity as tolerated  Diet: DIET GENERAL; No Added Salt (3-4 GM) Follow-up visits:   Brittney Clifton MD  Harjit Kamara  [phone]               Discharge Medications:      Holden, [address] Medication Instructions KYK:[id number]    Printed on:09/16/20 1722   Medication Information                      vitamin C (ASCORBIC ACID) 500 MG tablet  Take 1,000 mg by mouth daily                 Time Spent on discharge is more than 45 minutes in the examination, evaluation, counseling and review of medications and discharge plan. Signed: Thank you Brittney Clifton MD for the opportunity to be involved in this patient's care.     Electronically signed by Kathy Gruber MD on 9/16/2020 at 5:22 PM

## 2020-10-22 NOTE — PROCEDURES
[address] [place] [address]                                 EVENT MONITOR    PATIENT NAME: BETHEL BENAVIDES                       :        1970  MED REC NO:   [id number]                           ROOM:       0009  ACCOUNT NO:   [de-identified]                           ADMIT DATE: 09/15/2020  PROVIDER:     Michael Barragan MD    TEST TYPE:  Event monitor report. DATE OF STUDY:  From 2020 to 10/15/2020. INDICATION FOR STUDY:  Syncope. The patient's monitoring period was from 2020 to 10/15/2020. The  patient was monitored for a total of 30 days. INTERPRETATION:  The baseline rhythm was sinus rhythm with episodes of  sinus bradycardia and sinus tachycardia. The average heart rate over  the 30-day period was 58 beats per minute. There were no significant  atrial or ventricular tachyarrhythmias. There was no atrial  fibrillation or flutter on this study. There were no high grade AV  blocks or sinus pauses greater than 3 seconds. SUMMARY:  Clinical correlation is necessary.         Nicki Keating MD    D: 10/22/2020 14:09:40       T: 10/22/2020 16:04:01     [id number]  Job#: [id number]     Doc#: [id number]    CC:

## 2020-10-29 ENCOUNTER — OFFICE VISIT (OUTPATIENT)
Dept: CARDIOLOGY CLINIC | Age: 50
End: 2020-10-29
Payer: COMMERCIAL

## 2020-10-29 VITALS
HEIGHT: 70 IN | DIASTOLIC BLOOD PRESSURE: 68 MMHG | SYSTOLIC BLOOD PRESSURE: 122 MMHG | BODY MASS INDEX: 19.51 KG/M2 | WEIGHT: 136.25 LBS

## 2020-10-29 PROCEDURE — 99213 OFFICE O/P EST LOW 20 MIN: CPT | Performed by: INTERNAL MEDICINE

## 2020-10-29 NOTE — PROGRESS NOTES
[address] [facility] [address] [place] [address]  Dept: [phone]  Dept Fax: [phone]  Loc: [phone]    Visit Date: 10/29/2020    Mr. Holden is a 47 y.o. male  who presented for:  Chief Complaint   Patient presents with    Check-Up     fu event monitor    Loss of Consciousness       HPI:   46 yo M c hx of smoker was in the hospital for syncopal episode. He underwent EKG, Echo and 30 day event monitor. No significant findings on those test.  He reports having 2 more episodes of presyncopal episodes. The symptoms happen when standing. Current Outpatient Medications:     vitamin C (ASCORBIC ACID) 500 MG tablet, Take 1,000 mg by mouth daily, Disp: , Rfl:     Past Medical History  Rhoda Stacy  has a past medical history of Gout of big toe. Social History  Rhoda Stacy  reports that he has been smoking cigarettes. He has a 6.25 pack-year smoking history. He has never used smokeless tobacco. He reports current alcohol use. He reports current drug use. Drug: Marijuana. Family History  Rhoda Stacy family history includes High Blood Pressure in his mother; Other in his father. Past Surgical History   Past Surgical History:   Procedure Laterality Date    HIP FRACTURE SURGERY Right     pins placed    KNEE ARTHROCENTESIS Bilateral     NASAL SINUS SURGERY Left        Subjective:     REVIEW OF SYSTEMS  Constitutional: denies sweats, chills and fever  HENT: denies  congestion, sinus pressure, sneezing and sore throat. Eyes: denies  pain, discharge, redness and itching. Respiratory: denies apnea, cough  Gastrointestinal: denies blood in stool, constipation, diarrhea   Endocrine: denies cold intolerance, heat intolerance, polydipsia. Genitourinary: denies dysuria, enuresis, flank pain and hematuria. Musculoskeletal: denies arthralgias, joint swelling and neck pain. Neurological: denies numbness and headaches.    Psychiatric/Behavioral: denies agitation, confusion, decreased concentration and dysphoric mood    All others reviewed and are negative. Objective:     /68   Ht 5' 10\" (1.778 m)   Wt 136 lb 4 oz (61.8 kg)   BMI 19.55 kg/m²     Wt Readings from Last 3 Encounters:   10/29/20 136 lb 4 oz (61.8 kg)   09/16/20 127 lb 3.2 oz (57.7 kg)   04/22/17 126 lb (57.2 kg)     BP Readings from Last 3 Encounters:   10/29/20 122/68   09/16/20 121/69   04/22/17 117/81       PHYSICAL EXAM  Constitutional: Oriented to person, place, and time. Appears well-developed and well-nourished. HENT:   Head: Normocephalic and atraumatic. Eyes: EOM are normal. Pupils are equal, round, and reactive to light. Neck: Normal range of motion. Neck supple. No JVD present. Cardiovascular: Normal rate , normal heart sounds and intact distal pulses. Pulmonary/Chest: Effort normal and breath sounds normal. No respiratory distress. No wheezes. No rales. Abdominal: Soft. Bowel sounds are normal. No distension. There is no tenderness. Musculoskeletal: Normal range of motion. No edema. Neurological: Alert and oriented to person, place, and time. No cranial nerve deficit. Coordination normal.   Skin: Skin is warm and dry. Psychiatric: Normal mood and affect.        No results found for: CKTOTAL, CKMB, CKMBINDEX    Lab Results   Component Value Date    WBC 5.5 09/16/2020    RBC 4.90 09/16/2020    HGB 15.8 09/16/2020    HCT 46.9 09/16/2020    MCV 95.7 09/16/2020    MCH 32.2 09/16/2020    MCHC 33.7 09/16/2020    RDW 14.4 04/22/2017     09/16/2020    MPV 9.1 09/16/2020       Lab Results   Component Value Date     09/16/2020    K 4.5 09/16/2020     09/16/2020    CO2 23 09/16/2020    BUN 17 09/16/2020    CREATININE 0.9 09/16/2020    CALCIUM 9.2 09/16/2020    LABGLOM 89 09/16/2020    GLUCOSE 105 09/16/2020       No results found for: ALKPHOS, ALT, AST, PROT, BILITOT, BILIDIR, IBILI, LABALBU    No results found for: MG    No results found for: INR, thickness. Signature      ----------------------------------------------------------------   Electronically signed by Vincent Villanueva MD (Interpreting   physician) on 09/15/2020 at 04:11 PM   ----------------------------------------------------------------      Findings      Mitral Valve   The mitral valve structure is normal with normal leaflet separation. DOPPLER: The transmitral velocity was within the normal range with no   evidence for mitral stenosis. There was no evidence of mitral   regurgitation. Aortic Valve   The aortic valve appears trileaflet with normal thickness and leaflet   excursion. DOPPLER: Transaortic velocity was within the normal range with   no evidence of aortic stenosis. There was no evidence of aortic   regurgitation. Tricuspid Valve   The tricuspid valve structure is normal with normal leaflet separation. DOPPLER: There is no evidence of tricuspid stenosis. There was no evidence   of tricuspid regurgitation. Pulmonic Valve   The pulmonic valve leaflets appear normal thickness, and normal cuspal   separation. DOPPLER: The transpulmonic velocity was within the normal   range. No evidence for regurgitation. Left Atrium   Left atrial size is normal.      Left Ventricle   Left ventricular size and systolic function is normal. Ejection fraction   was estimated at 55-60%. LV wall thickness is within normal limits and   there are no obvious wall motion abnormalities. Right Atrium   Right atrial size was normal.      Right Ventricle   The right ventricular size appears normal with normal systolic function   and wall thickness. Pericardial Effusion   The pericardium appears normal with no evidence of a pericardial effusion. Pleural Effusion   No evidence of pleural effusion. Aorta / Great Vessels   -Aortic root dimension within normal limits. -IVC size is within normal limits with normal respiratory phasic changes.      M-Mode/2D Measurements & Calculations      LV Diastolic   LV Systolic Dimension:    AV Cusp Separation: 1.9 cmLA   Dimension: 3.9 2.9 cm                    Dimension: 2.7 cmAO Root   cm             LV Volume Diastolic: 33.3 Dimension: 3.1 cmLA Area: 8.1   LV FS:25.6 %   ml                        cm^2   LV PW          LV Volume Systolic: 47.4   Diastolic: 0.7 ml   cm             LV EDV/LV EDV Index: 65.9   Septum         ml/39 m^2LV ESV/LV ESV    RV Diastolic Dimension: 2.4 cm   Diastolic: 0.6 Index: 12.4 ml/19 m^2   cm             EF Calculated: 51.1 %     LA/Aorta: 0.87                                               LA volume/Index: 14.3 ml /8m^2     Doppler Measurements & Calculations      MV Peak E-Wave: 69 cm/s    AV Peak Velocity: 122 LVOT Peak Velocity: 94.7   MV Peak A-Wave: 54 cm/s    cm/s                  cm/s   MV E/A Ratio: 1.28         AV Peak Gradient:     LVOT Peak Gradient: 4   MV Peak Gradient: 1.9 mmHg 5.95 mmHg             mmHg      MV Deceleration Time: 208                        TV Peak E-Wave: 39.8 cm/s   msec                                             TV Peak A-Wave: 34.3 cm/s   MV P1/2t: 61 msec   MVA by PHT:3.61 cm^2       IVRT: 85 msec         TV Peak Gradient: 0.63                                                    mmHg   MV E' Septal Velocity:                           TR Velocity:251 cm/s   13.6 cm/s                  AV DVI (Vmax):0.78    TR Gradient:25.2 mmHg   MV A' Septal Velocity: 7.8                       PV Peak Velocity: 63.4   cm/s                                             cm/s   MV E' Lateral Velocity:                          PV Peak Gradient: 1.61   12.9 cm/s                                        mmHg   MV A' Lateral Velocity:   6.7 cm/s   E/E' septal: 5.07   E/E' lateral: 5.35     [email or web address] [id number]       STRESS:    CATH:    Assessment/Plan       Diagnosis Orders   1.  Syncope and collapse       Syncopal   Smoker    Reviewed Event monitor no significant fidnings  Echo normal  BP well controlleed  Advised salt intake and hydration  Advised compression socks  Advised exercise regimen and to avoid situation in which he has symptoms   The patient is asked to make an attempt to improve diet and exercise patterns to aid in medical management of this problem. Advised more plant based nutrition/meditarrean diet   Advised patient to call office or seek immediate medical attention if there is any new onset of  any chest pain, sob, palpitations, lightheadedness, dizziness, orthopnea, PND or pedal edema. All medication side effects were discussed in details. Thank youfor allowing me to participate in the care of this patient. Please do not hesitate to contact me for any further questions. No follow-ups on file.        Electronically signed by Анна Dhaliwal MD [facility] [place] [facility]  10/29/2020 at 1:54 PM EDT

## 2021-12-02 ENCOUNTER — HOSPITAL ENCOUNTER (EMERGENCY)
Age: 51
Discharge: HOME | End: 2021-12-02
Attending: EMERGENCY MEDICINE
Payer: COMMERCIAL

## 2021-12-02 ENCOUNTER — APPOINTMENT (OUTPATIENT)
Dept: GENERAL RADIOLOGY | Age: 51
End: 2021-12-02
Payer: COMMERCIAL

## 2021-12-02 VITALS
DIASTOLIC BLOOD PRESSURE: 80 MMHG | SYSTOLIC BLOOD PRESSURE: 131 MMHG | WEIGHT: 133 LBS | OXYGEN SATURATION: 96 % | HEIGHT: 70 IN | RESPIRATION RATE: 12 BRPM | BODY MASS INDEX: 19.04 KG/M2 | HEART RATE: 59 BPM | TEMPERATURE: 97.8 F

## 2021-12-02 DIAGNOSIS — R55 SYNCOPE AND COLLAPSE: Primary | ICD-10-CM

## 2021-12-02 LAB
ALBUMIN SERPL-MCNC: 4.6 G/DL (ref 3.5–5.1)
ALP BLD-CCNC: 68 U/L (ref 38–126)
ALT SERPL-CCNC: 12 U/L (ref 11–66)
ANION GAP SERPL CALCULATED.3IONS-SCNC: 11 MEQ/L (ref 8–16)
AST SERPL-CCNC: 18 U/L (ref 5–40)
BASOPHILS # BLD: 0.4 %
BASOPHILS ABSOLUTE: 0 THOU/MM3 (ref 0–0.1)
BILIRUB SERPL-MCNC: 0.4 MG/DL (ref 0.3–1.2)
BUN BLDV-MCNC: 21 MG/DL (ref 7–22)
CALCIUM SERPL-MCNC: 9 MG/DL (ref 8.5–10.5)
CHLORIDE BLD-SCNC: 103 MEQ/L (ref 98–111)
CO2: 23 MEQ/L (ref 23–33)
CREAT SERPL-MCNC: 0.9 MG/DL (ref 0.4–1.2)
EKG ATRIAL RATE: 67 BPM
EKG P AXIS: 73 DEGREES
EKG P-R INTERVAL: 138 MS
EKG Q-T INTERVAL: 408 MS
EKG QRS DURATION: 92 MS
EKG QTC CALCULATION (BAZETT): 431 MS
EKG R AXIS: 74 DEGREES
EKG T AXIS: 63 DEGREES
EKG VENTRICULAR RATE: 67 BPM
EOSINOPHIL # BLD: 1.2 %
EOSINOPHILS ABSOLUTE: 0.1 THOU/MM3 (ref 0–0.4)
ERYTHROCYTE [DISTWIDTH] IN BLOOD BY AUTOMATED COUNT: 13.2 % (ref 11.5–14.5)
ERYTHROCYTE [DISTWIDTH] IN BLOOD BY AUTOMATED COUNT: 45.5 FL (ref 35–45)
GFR SERPL CREATININE-BSD FRML MDRD: 89 ML/MIN/1.73M2
GLUCOSE BLD-MCNC: 110 MG/DL (ref 70–108)
HCT VFR BLD CALC: 42.8 % (ref 42–52)
HEMOGLOBIN: 14.7 GM/DL (ref 14–18)
IMMATURE GRANS (ABS): 0.05 THOU/MM3 (ref 0–0.07)
IMMATURE GRANULOCYTES: 0.4 %
LYMPHOCYTES # BLD: 8.9 %
LYMPHOCYTES ABSOLUTE: 1 THOU/MM3 (ref 1–4.8)
MCH RBC QN AUTO: 32.3 PG (ref 26–33)
MCHC RBC AUTO-ENTMCNC: 34.3 GM/DL (ref 32.2–35.5)
MCV RBC AUTO: 94.1 FL (ref 80–94)
MONOCYTES # BLD: 3.6 %
MONOCYTES ABSOLUTE: 0.4 THOU/MM3 (ref 0.4–1.3)
NUCLEATED RED BLOOD CELLS: 0 /100 WBC
OSMOLALITY CALCULATION: 277.4 MOSMOL/KG (ref 275–300)
PLATELET # BLD: 258 THOU/MM3 (ref 130–400)
PMV BLD AUTO: 8.9 FL (ref 9.4–12.4)
POTASSIUM REFLEX MAGNESIUM: 4.3 MEQ/L (ref 3.5–5.2)
PRO-BNP: 28.5 PG/ML (ref 0–900)
RBC # BLD: 4.55 MILL/MM3 (ref 4.7–6.1)
SEG NEUTROPHILS: 85.5 %
SEGMENTED NEUTROPHILS ABSOLUTE COUNT: 9.6 THOU/MM3 (ref 1.8–7.7)
SODIUM BLD-SCNC: 137 MEQ/L (ref 135–145)
TOTAL PROTEIN: 6.6 G/DL (ref 6.1–8)
TROPONIN T: < 0.01 NG/ML
TROPONIN T: < 0.01 NG/ML
WBC # BLD: 11.2 THOU/MM3 (ref 4.8–10.8)

## 2021-12-02 PROCEDURE — 99244 OFF/OP CNSLTJ NEW/EST MOD 40: CPT | Performed by: NURSE PRACTITIONER

## 2021-12-02 PROCEDURE — 83880 ASSAY OF NATRIURETIC PEPTIDE: CPT

## 2021-12-02 PROCEDURE — 99220 PR INITIAL OBSERVATION CARE/DAY 70 MINUTES: CPT | Performed by: INTERNAL MEDICINE

## 2021-12-02 PROCEDURE — 99284 EMERGENCY DEPT VISIT MOD MDM: CPT

## 2021-12-02 PROCEDURE — 84484 ASSAY OF TROPONIN QUANT: CPT

## 2021-12-02 PROCEDURE — 85025 COMPLETE CBC W/AUTO DIFF WBC: CPT

## 2021-12-02 PROCEDURE — 71045 X-RAY EXAM CHEST 1 VIEW: CPT

## 2021-12-02 PROCEDURE — 93005 ELECTROCARDIOGRAM TRACING: CPT | Performed by: STUDENT IN AN ORGANIZED HEALTH CARE EDUCATION/TRAINING PROGRAM

## 2021-12-02 PROCEDURE — G0378 HOSPITAL OBSERVATION PER HR: HCPCS

## 2021-12-02 PROCEDURE — 80053 COMPREHEN METABOLIC PANEL: CPT

## 2021-12-02 PROCEDURE — 93010 ELECTROCARDIOGRAM REPORT: CPT | Performed by: INTERNAL MEDICINE

## 2021-12-02 PROCEDURE — 36415 COLL VENOUS BLD VENIPUNCTURE: CPT

## 2021-12-02 RX ORDER — SODIUM CHLORIDE 0.9 % (FLUSH) 0.9 %
10 SYRINGE (ML) INJECTION EVERY 12 HOURS SCHEDULED
Status: DISCONTINUED | OUTPATIENT
Start: 2021-12-02 | End: 2021-12-02 | Stop reason: HOSPADM

## 2021-12-02 RX ORDER — SODIUM CHLORIDE 0.9 % (FLUSH) 0.9 %
10 SYRINGE (ML) INJECTION PRN
Status: DISCONTINUED | OUTPATIENT
Start: 2021-12-02 | End: 2021-12-02 | Stop reason: HOSPADM

## 2021-12-02 RX ORDER — ONDANSETRON 4 MG/1
4 TABLET, ORALLY DISINTEGRATING ORAL EVERY 8 HOURS PRN
Status: DISCONTINUED | OUTPATIENT
Start: 2021-12-02 | End: 2021-12-02 | Stop reason: HOSPADM

## 2021-12-02 RX ORDER — ONDANSETRON 2 MG/ML
4 INJECTION INTRAMUSCULAR; INTRAVENOUS EVERY 6 HOURS PRN
Status: DISCONTINUED | OUTPATIENT
Start: 2021-12-02 | End: 2021-12-02 | Stop reason: HOSPADM

## 2021-12-02 RX ORDER — POLYETHYLENE GLYCOL 3350 17 G/17G
17 POWDER, FOR SOLUTION ORAL DAILY PRN
Status: DISCONTINUED | OUTPATIENT
Start: 2021-12-02 | End: 2021-12-02 | Stop reason: HOSPADM

## 2021-12-02 RX ORDER — ACETAMINOPHEN 325 MG/1
650 TABLET ORAL EVERY 6 HOURS PRN
Status: DISCONTINUED | OUTPATIENT
Start: 2021-12-02 | End: 2021-12-02 | Stop reason: HOSPADM

## 2021-12-02 RX ORDER — DIVALPROEX SODIUM 500 MG/1
500 TABLET, DELAYED RELEASE ORAL EVERY 12 HOURS SCHEDULED
Status: DISCONTINUED | OUTPATIENT
Start: 2021-12-02 | End: 2021-12-02 | Stop reason: HOSPADM

## 2021-12-02 RX ORDER — SODIUM CHLORIDE 9 MG/ML
25 INJECTION, SOLUTION INTRAVENOUS PRN
Status: DISCONTINUED | OUTPATIENT
Start: 2021-12-02 | End: 2021-12-02 | Stop reason: HOSPADM

## 2021-12-02 RX ORDER — SODIUM CHLORIDE 9 MG/ML
INJECTION, SOLUTION INTRAVENOUS CONTINUOUS
Status: DISCONTINUED | OUTPATIENT
Start: 2021-12-02 | End: 2021-12-02 | Stop reason: HOSPADM

## 2021-12-02 RX ORDER — ACETAMINOPHEN 650 MG/1
650 SUPPOSITORY RECTAL EVERY 6 HOURS PRN
Status: DISCONTINUED | OUTPATIENT
Start: 2021-12-02 | End: 2021-12-02 | Stop reason: HOSPADM

## 2021-12-02 ASSESSMENT — ENCOUNTER SYMPTOMS
VOMITING: 0
COLOR CHANGE: 0
SHORTNESS OF BREATH: 0
EYES NEGATIVE: 1
DIARRHEA: 0
RHINORRHEA: 0
ABDOMINAL PAIN: 0
SINUS PRESSURE: 0
EYE ITCHING: 0
GASTROINTESTINAL NEGATIVE: 1
ABDOMINAL DISTENTION: 0
SHORTNESS OF BREATH: 1
EYE DISCHARGE: 0
RESPIRATORY NEGATIVE: 1
EYE REDNESS: 0
EYE PAIN: 0
CONSTIPATION: 0
CHEST TIGHTNESS: 0
SORE THROAT: 0
COUGH: 0
NAUSEA: 0
SINUS PAIN: 0
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
## 2021-12-02 NOTE — ED NOTES
Pt resting on cot at this time. No needs voiced at this time.       Hitesh Montgomery RN  12/02/21 [id number]

## 2021-12-02 NOTE — Clinical Note
Patient Class: Observation [104]   REQUIRED: Diagnosis: Syncope and collapse [780. 2. ICD-9-CM]   Estimated Length of Stay: Estimated stay of less than 2 midnights

## 2021-12-02 NOTE — ED TRIAGE NOTES
Pt to ER with complaints of syncopal episode. Pts family states that [place] and pt stated he didn't feel good and lost consciousness. Pt states he was diaphoretic. Pt states he has a history of this happening but it hasn't happened for 6-8 months. EKG performed.
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
## 2021-12-02 NOTE — ED PROVIDER NOTES

## 2021-12-02 NOTE — ED NOTES
Pt resting in bed, VSS. Family at bedside, provider talking to the patient and family at this time .      Young Briggs, [place]  01/40/80 2167

## 2021-12-02 NOTE — H&P
Sexually Active  Not Asked                Family History     Family History   Problem Relation Age of Onset    High Blood Pressure Mother     Other Father         brain aneurysm--mid 62s       Review of Systems   Review of Systems   Constitutional: Negative. HENT: Negative. Eyes: Negative. Respiratory: Negative. Cardiovascular: Negative. Gastrointestinal: Negative. Endocrine: Negative. Genitourinary: Negative. Musculoskeletal: Negative. Neurological: Positive for syncope. Hematological: Negative. Psychiatric/Behavioral: Negative. Physical Exam   /78   Pulse 68   Temp 97.8 °F (36.6 °C) (Oral)   Resp 14   Ht 5' 10\" (1.778 m)   Wt 133 lb (60.3 kg)   SpO2 96%   BMI 19.08 kg/m²     Physical Exam  Constitutional:       Appearance: Normal appearance. He is normal weight. HENT:      Head: Normocephalic and atraumatic. Right Ear: External ear normal.      Left Ear: External ear normal.      Nose: Nose normal.      Mouth/Throat:      Mouth: Mucous membranes are dry. Pharynx: Oropharynx is clear. Eyes:      Extraocular Movements: Extraocular movements intact. Conjunctiva/sclera: Conjunctivae normal.      Pupils: Pupils are equal, round, and reactive to light. Cardiovascular:      Rate and Rhythm: Normal rate and regular rhythm. Pulses: Normal pulses. Pulmonary:      Effort: Pulmonary effort is normal.   Abdominal:      General: Abdomen is flat. Palpations: Abdomen is soft. Musculoskeletal:         General: Normal range of motion. Cervical back: Normal range of motion and neck supple. Skin:     General: Skin is warm. Capillary Refill: Capillary refill takes less than 2 seconds. Neurological:      General: No focal deficit present. Mental Status: He is alert.    Psychiatric:         Mood and Affect: Mood normal.         Labs      Recent Results (from the past 24 hour(s))   EKG 12 Lead    Collection Time: 12/02/21  9:11 AM Result Value Ref Range    Ventricular Rate 67 BPM    Atrial Rate 67 BPM    P-R Interval 138 ms    QRS Duration 92 ms    Q-T Interval 408 ms    QTc Calculation (Bazett) 431 ms    P Axis 73 degrees    R Axis 74 degrees    T Axis 63 degrees   CBC Auto Differential    Collection Time: 12/02/21 10:18 AM   Result Value Ref Range    WBC 11.2 (H) 4.8 - 10.8 thou/mm3    RBC 4.55 (L) 4.70 - 6.10 mill/mm3    Hemoglobin 14.7 14.0 - 18.0 gm/dl    Hematocrit 42.8 42.0 - 52.0 %    MCV 94.1 (H) 80.0 - 94.0 fL    MCH 32.3 26.0 - 33.0 pg    MCHC 34.3 32.2 - 35.5 gm/dl    RDW-CV 13.2 11.5 - 14.5 %    RDW-SD 45.5 (H) 35.0 - 45.0 fL    Platelets 300 855 - 526 thou/mm3    MPV 8.9 (L) 9.4 - 12.4 fL    Seg Neutrophils 85.5 %    Lymphocytes 8.9 %    Monocytes 3.6 %    Eosinophils 1.2 %    Basophils 0.4 %    Immature Granulocytes 0.4 %    Segs Absolute 9.6 (H) 1.8 - 7.7 thou/mm3    Lymphocytes Absolute 1.0 1.0 - 4.8 thou/mm3    Monocytes Absolute 0.4 0.4 - 1.3 thou/mm3    Eosinophils Absolute 0.1 0.0 - 0.4 thou/mm3    Basophils Absolute 0.0 0.0 - 0.1 thou/mm3    Immature Grans (Abs) 0.05 0.00 - 0.07 thou/mm3    nRBC 0 /100 wbc   Comprehensive Metabolic Panel w/ Reflex to MG    Collection Time: 12/02/21 10:18 AM   Result Value Ref Range    Glucose 110 (H) 70 - 108 mg/dL    CREATININE 0.9 0.4 - 1.2 mg/dL    BUN 21 7 - 22 mg/dL    Sodium 137 135 - 145 meq/L    Potassium reflex Magnesium 4.3 3.5 - 5.2 meq/L    Chloride 103 98 - 111 meq/L    CO2 23 23 - 33 meq/L    Calcium 9.0 8.5 - 10.5 mg/dL    AST 18 5 - 40 U/L    Alkaline Phosphatase 68 38 - 126 U/L    Total Protein 6.6 6.1 - 8.0 g/dL    Albumin 4.6 3.5 - 5.1 g/dL    Total Bilirubin 0.4 0.3 - 1.2 mg/dL    ALT 12 11 - 66 U/L   Troponin    Collection Time: 12/02/21 10:18 AM   Result Value Ref Range    Troponin T < 0.010 ng/ml   Brain Natriuretic Peptide    Collection Time: 12/02/21 10:18 AM   Result Value Ref Range    Pro-BNP 28.5 0.0 - 900.0 pg/mL   Anion Gap    Collection Time: 12/02/21

## 2021-12-10 ENCOUNTER — HOSPITAL ENCOUNTER (OUTPATIENT)
Dept: NEUROLOGY | Age: 51
Discharge: HOME | End: 2021-12-10
Payer: COMMERCIAL

## 2021-12-10 DIAGNOSIS — R55 SYNCOPE AND COLLAPSE: ICD-10-CM

## 2021-12-10 PROCEDURE — 95816 EEG AWAKE AND DROWSY: CPT

## 2021-12-10 PROCEDURE — 95816 EEG AWAKE AND DROWSY: CPT | Performed by: PSYCHIATRY & NEUROLOGY
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
## 2021-12-13 NOTE — PROCEDURES
[address] [id number]                          ELECTROENCEPHALOGRAM REPORT    PATIENT NAME: Quyen Ziegler                       :        1970  MED REC NO:   [id number]                           ROOM:  ACCOUNT NO:   [de-identified]                           ADMIT DATE: 12/10/2021  PROVIDER:     Buck Ricardo. Kailee Pineda MD    DATE OF EE/10/2021    REFERRING PROVIDER:  Corazon Potter M.D. CLINICAL HISTORY:  A 14-year-old male presenting with syncope and  collapse, evaluate for seizure. CLINICAL INTERPRETATION:  This is a 17-channel EEG performed without  sleep deprivation. Hyperventilation and photic stimulation were  performed. The patient is described as alert. Medications listed are  vitamin C. The background activity was noted to be 9-10 Hz in the posterior  parietal area, symmetric, well modulated, attenuates with eye opening. Hyperventilation was performed for 3 minutes with good effort without  abnormality. Lead artifact was noted limiting quality of data obtained. There was no evidence of epileptiform activity appreciated. Photic  stimulation was performed with driving seen through some of the  frequencies. IMPRESSION:  This is a normal EEG. There was no evidence of  epileptiform activity appreciated.         Zurdo Davila MD    D: 2021 15:50:33       T: 2021 15:54:00     [email or web address]  Job#: [id number]     Doc#: [id number]    CC:  Corazon Potter M.D.